# Patient Record
Sex: FEMALE | Race: BLACK OR AFRICAN AMERICAN | NOT HISPANIC OR LATINO | Employment: FULL TIME | URBAN - METROPOLITAN AREA
[De-identification: names, ages, dates, MRNs, and addresses within clinical notes are randomized per-mention and may not be internally consistent; named-entity substitution may affect disease eponyms.]

---

## 2023-03-02 PROBLEM — H52.4 BILATERAL PRESBYOPIA: Status: ACTIVE | Noted: 2023-03-02

## 2023-03-02 PROBLEM — G56.01 RIGHT CARPAL TUNNEL SYNDROME: Status: ACTIVE | Noted: 2023-03-02

## 2023-03-02 PROBLEM — M65.332 TRIGGER MIDDLE FINGER OF LEFT HAND: Status: ACTIVE | Noted: 2023-03-02

## 2023-03-02 PROBLEM — I63.50 RIGHT PONTINE STROKE (MULTI): Status: ACTIVE | Noted: 2023-03-02

## 2023-03-02 PROBLEM — I10 HTN (HYPERTENSION): Status: ACTIVE | Noted: 2023-03-02

## 2023-03-02 PROBLEM — M79.643 HAND PAIN: Status: ACTIVE | Noted: 2023-03-02

## 2023-03-02 PROBLEM — E66.01 MORBID OBESITY WITH BODY MASS INDEX (BMI) OF 40.0 OR HIGHER (MULTI): Status: ACTIVE | Noted: 2023-03-02

## 2023-03-02 PROBLEM — M65.311 TRIGGER THUMB OF RIGHT HAND: Status: ACTIVE | Noted: 2023-03-02

## 2023-03-02 PROBLEM — E11.3393 TYPE 2 DIABETES MELLITUS WITH MODERATE NONPROLIFERATIVE DIABETIC RETINOPATHY OF BOTH EYES WITHOUT MACULAR EDEMA (MULTI): Status: ACTIVE | Noted: 2023-03-02

## 2023-03-02 PROBLEM — E78.2 MIXED HYPERLIPIDEMIA: Status: ACTIVE | Noted: 2023-03-02

## 2023-03-02 RX ORDER — INSULIN LISPRO 100 [IU]/ML
INJECTION, SOLUTION INTRAVENOUS; SUBCUTANEOUS
COMMUNITY
Start: 2018-05-17 | End: 2023-03-22 | Stop reason: SDUPTHER

## 2023-03-02 RX ORDER — METOPROLOL TARTRATE 100 MG/1
TABLET ORAL
COMMUNITY
Start: 2020-11-24 | End: 2023-03-22 | Stop reason: SDUPTHER

## 2023-03-02 RX ORDER — ALBUTEROL SULFATE 90 UG/1
AEROSOL, METERED RESPIRATORY (INHALATION)
COMMUNITY
Start: 2022-02-11

## 2023-03-02 RX ORDER — INSULIN DETEMIR 100 [IU]/ML
INJECTION, SOLUTION SUBCUTANEOUS
COMMUNITY
Start: 2021-06-30 | End: 2023-03-22 | Stop reason: SDUPTHER

## 2023-03-02 RX ORDER — AMLODIPINE BESYLATE 10 MG/1
TABLET ORAL
COMMUNITY
Start: 2018-05-17 | End: 2023-03-22 | Stop reason: SDUPTHER

## 2023-03-02 RX ORDER — BLOOD SUGAR DIAGNOSTIC
STRIP MISCELLANEOUS
COMMUNITY
End: 2023-03-22 | Stop reason: SDUPTHER

## 2023-03-02 RX ORDER — ATORVASTATIN CALCIUM 40 MG/1
1 TABLET, FILM COATED ORAL DAILY
COMMUNITY
Start: 2018-05-17 | End: 2023-03-22 | Stop reason: SDUPTHER

## 2023-03-02 RX ORDER — LOSARTAN POTASSIUM 100 MG/1
1 TABLET ORAL DAILY
COMMUNITY
Start: 2018-05-17 | End: 2023-03-22 | Stop reason: SDUPTHER

## 2023-03-13 ENCOUNTER — APPOINTMENT (OUTPATIENT)
Dept: PRIMARY CARE | Facility: CLINIC | Age: 53
End: 2023-03-13
Payer: COMMERCIAL

## 2023-03-22 ENCOUNTER — OFFICE VISIT (OUTPATIENT)
Dept: PRIMARY CARE | Facility: CLINIC | Age: 53
End: 2023-03-22
Payer: COMMERCIAL

## 2023-03-22 VITALS
BODY MASS INDEX: 41.84 KG/M2 | HEIGHT: 61 IN | DIASTOLIC BLOOD PRESSURE: 75 MMHG | WEIGHT: 221.6 LBS | TEMPERATURE: 96 F | HEART RATE: 95 BPM | OXYGEN SATURATION: 98 % | SYSTOLIC BLOOD PRESSURE: 148 MMHG

## 2023-03-22 DIAGNOSIS — Z79.4 TYPE 2 DIABETES MELLITUS WITH BOTH EYES AFFECTED BY MODERATE NONPROLIFERATIVE RETINOPATHY WITHOUT MACULAR EDEMA, WITH LONG-TERM CURRENT USE OF INSULIN (MULTI): ICD-10-CM

## 2023-03-22 DIAGNOSIS — E11.3393 TYPE 2 DIABETES MELLITUS WITH BOTH EYES AFFECTED BY MODERATE NONPROLIFERATIVE RETINOPATHY WITHOUT MACULAR EDEMA, WITH LONG-TERM CURRENT USE OF INSULIN (MULTI): ICD-10-CM

## 2023-03-22 DIAGNOSIS — E66.01 MORBID OBESITY WITH BODY MASS INDEX (BMI) OF 40.0 OR HIGHER (MULTI): ICD-10-CM

## 2023-03-22 DIAGNOSIS — E78.2 MIXED HYPERLIPIDEMIA: ICD-10-CM

## 2023-03-22 DIAGNOSIS — I10 PRIMARY HYPERTENSION: Primary | ICD-10-CM

## 2023-03-22 LAB — POC HEMOGLOBIN A1C: 9.3 % (ref 4.2–6.5)

## 2023-03-22 PROCEDURE — 83036 HEMOGLOBIN GLYCOSYLATED A1C: CPT | Performed by: FAMILY MEDICINE

## 2023-03-22 PROCEDURE — 99214 OFFICE O/P EST MOD 30 MIN: CPT | Performed by: FAMILY MEDICINE

## 2023-03-22 PROCEDURE — 3077F SYST BP >= 140 MM HG: CPT | Performed by: FAMILY MEDICINE

## 2023-03-22 PROCEDURE — 4010F ACE/ARB THERAPY RXD/TAKEN: CPT | Performed by: FAMILY MEDICINE

## 2023-03-22 PROCEDURE — 3078F DIAST BP <80 MM HG: CPT | Performed by: FAMILY MEDICINE

## 2023-03-22 PROCEDURE — 1036F TOBACCO NON-USER: CPT | Performed by: FAMILY MEDICINE

## 2023-03-22 RX ORDER — AMLODIPINE BESYLATE 10 MG/1
10 TABLET ORAL DAILY
Qty: 90 TABLET | Refills: 1 | Status: SHIPPED | OUTPATIENT
Start: 2023-03-22

## 2023-03-22 RX ORDER — INSULIN LISPRO 100 [IU]/ML
INJECTION, SOLUTION INTRAVENOUS; SUBCUTANEOUS
Qty: 15 EACH | Refills: 1 | Status: SHIPPED | OUTPATIENT
Start: 2023-03-22 | End: 2023-05-15 | Stop reason: SDUPTHER

## 2023-03-22 RX ORDER — INSULIN DETEMIR 100 [IU]/ML
40 INJECTION, SOLUTION SUBCUTANEOUS DAILY
Qty: 36 ML | Refills: 1 | Status: SHIPPED | OUTPATIENT
Start: 2023-03-22 | End: 2023-03-22

## 2023-03-22 RX ORDER — INSULIN DETEMIR 100 [IU]/ML
INJECTION, SOLUTION SUBCUTANEOUS
Qty: 15 EACH | Refills: 1 | Status: SHIPPED | OUTPATIENT
Start: 2023-03-22

## 2023-03-22 RX ORDER — ATORVASTATIN CALCIUM 40 MG/1
40 TABLET, FILM COATED ORAL DAILY
Qty: 90 TABLET | Refills: 1 | Status: SHIPPED | OUTPATIENT
Start: 2023-03-22

## 2023-03-22 RX ORDER — LOSARTAN POTASSIUM 100 MG/1
100 TABLET ORAL DAILY
Qty: 90 TABLET | Refills: 1 | Status: SHIPPED | OUTPATIENT
Start: 2023-03-22

## 2023-03-22 RX ORDER — BLOOD SUGAR DIAGNOSTIC
1 STRIP MISCELLANEOUS 4 TIMES DAILY
Qty: 360 EACH | Refills: 1 | Status: SHIPPED | OUTPATIENT
Start: 2023-03-22 | End: 2023-06-20

## 2023-03-22 RX ORDER — METOPROLOL TARTRATE 100 MG/1
100 TABLET ORAL 2 TIMES DAILY
Qty: 180 TABLET | Refills: 1 | Status: SHIPPED | OUTPATIENT
Start: 2023-03-22

## 2023-03-22 ASSESSMENT — ENCOUNTER SYMPTOMS
FATIGUE: 1
BLURRED VISION: 0
VISUAL CHANGE: 0

## 2023-03-22 ASSESSMENT — PATIENT HEALTH QUESTIONNAIRE - PHQ9
2. FEELING DOWN, DEPRESSED OR HOPELESS: SEVERAL DAYS
1. LITTLE INTEREST OR PLEASURE IN DOING THINGS: SEVERAL DAYS
SUM OF ALL RESPONSES TO PHQ9 QUESTIONS 1 AND 2: 2
10. IF YOU CHECKED OFF ANY PROBLEMS, HOW DIFFICULT HAVE THESE PROBLEMS MADE IT FOR YOU TO DO YOUR WORK, TAKE CARE OF THINGS AT HOME, OR GET ALONG WITH OTHER PEOPLE: SOMEWHAT DIFFICULT

## 2023-03-22 ASSESSMENT — PAIN SCALES - GENERAL: PAINLEVEL: 0-NO PAIN

## 2023-03-22 NOTE — PATIENT INSTRUCTIONS
BP running a little high today.  Has been under stress.  No change in dose.  Monitor at home if able.    A1C 9.3, up from 8.3 last time.  Continue 40 units of levemir daily with 10 units humalog with meals.  Work on improving diet, increasing exercise.  Will refer to  pharmacist to see if we can adjust medication for better control.  Follow up 3-4 months.  Check fasting blood work some morning.                                           Treatment goals include:  HgbA1C less than 7.0  /80 on consistent basis, with no higher than 140/85  LDL cholesterol less than 100, and HDL cholesterol above 40.  Yearly retinal exam  Check feet periodically for sores or decreased sensation  Exercise at least 150 minutes weekly.  Work toward a goal BMI of less than 25.

## 2023-03-22 NOTE — ASSESSMENT & PLAN NOTE
A1C 9.3, up from 8.3 last time.  Continue 40 units of levemir daily with 10 units humalog with meals.  Work on improving diet, increasing exercise.  Will refer to  pharmacist to see if we can adjust medication for better control.  Follow up 3-4 months.

## 2023-03-22 NOTE — ASSESSMENT & PLAN NOTE
BP running a little high today.  Has been under stress.  No change in dose.  Monitor at home if able.

## 2023-03-22 NOTE — PROGRESS NOTES
"Subjective   Patient ID: Snow Lr is a 52 y.o. female who presents for Diabetes (Pt. Presents for 3 month DM follow up).  Not checking BP at home.    Not checking BS at home.  Levemir 40 units  Humalog with meals 10    Has a lot of stress right now:  Son my be legally blind, 31 years old, disabled.   heart surgery, stroke, and she is helping him.  Working full time.    Diabetes  She presents for her follow-up diabetic visit. She has type 2 diabetes mellitus. Her disease course has been worsening. There are no hypoglycemic associated symptoms. Associated symptoms include fatigue and foot paresthesias. Pertinent negatives for diabetes include no blurred vision, no chest pain, no foot ulcerations and no visual change. (Occasional tingling in feet, not often.) Diabetic complications include a CVA. Current diabetic treatment includes insulin injections. She is compliant with treatment all of the time.       Review of Systems   Constitutional:  Positive for fatigue.   Eyes:  Negative for blurred vision.   Cardiovascular:  Negative for chest pain.       Objective   /75   Pulse 95   Temp 35.6 °C (96 °F) (Temporal)   Ht 1.549 m (5' 1\")   Wt 101 kg (221 lb 9.6 oz)   SpO2 98%   BMI 41.87 kg/m²     Physical Exam  Constitutional:       Appearance: Normal appearance.   Cardiovascular:      Rate and Rhythm: Normal rate and regular rhythm.      Heart sounds: Murmur heard.   Musculoskeletal:      Right lower leg: No edema.      Left lower leg: No edema.   Neurological:      Mental Status: She is alert.           Assessment/Plan   Problem List Items Addressed This Visit          Circulatory    HTN (hypertension) - Primary     BP running a little high today.  Has been under stress.  No change in dose.  Monitor at home if able.         Relevant Medications    amLODIPine (Norvasc) 10 mg tablet    losartan (Cozaar) 100 mg tablet    metoprolol tartrate (Lopressor) 100 mg tablet       Endocrine/Metabolic    Morbid " "obesity with body mass index (BMI) of 40.0 or higher (CMS/[facility])    Type 2 diabetes mellitus with moderate nonproliferative diabetic retinopathy of both eyes without macular edema (CMS/[facility])     A1C 9.3, up from 8.3 last time.  Continue 40 units of levemir daily with 10 units humalog with meals.  Work on improving diet, increasing exercise.  Will refer to  pharmacist to see if we can adjust medication for better control.  Follow up 3-4 months.         Relevant Medications    insulin lispro (HumaLOG KwikPen Insulin) 100 unit/mL injection    pen needle, diabetic 32 gauge x 1/4\" needle    Other Relevant Orders    Follow Up In [facility] - Pharmacy    Albumin , Urine Random    Basic Metabolic Panel    Lipid Panel    POCT glycosylated hemoglobin (Hb A1C) manually resulted (Completed)       Other    Mixed hyperlipidemia     Continue atorvastatin.  Check fasting lipids.         Relevant Medications    atorvastatin (Lipitor) 40 mg tablet          "

## 2023-05-15 ENCOUNTER — TELEPHONE (OUTPATIENT)
Dept: PRIMARY CARE | Facility: CLINIC | Age: 53
End: 2023-05-15
Payer: COMMERCIAL

## 2023-05-15 DIAGNOSIS — Z79.4 TYPE 2 DIABETES MELLITUS WITH BOTH EYES AFFECTED BY MODERATE NONPROLIFERATIVE RETINOPATHY WITHOUT MACULAR EDEMA, WITH LONG-TERM CURRENT USE OF INSULIN (MULTI): ICD-10-CM

## 2023-05-15 DIAGNOSIS — E11.3393 TYPE 2 DIABETES MELLITUS WITH BOTH EYES AFFECTED BY MODERATE NONPROLIFERATIVE RETINOPATHY WITHOUT MACULAR EDEMA, WITH LONG-TERM CURRENT USE OF INSULIN (MULTI): ICD-10-CM

## 2023-05-15 RX ORDER — INSULIN LISPRO 100 [IU]/ML
INJECTION, SOLUTION INTRAVENOUS; SUBCUTANEOUS
Qty: 45 EACH | Refills: 1 | Status: SHIPPED | OUTPATIENT
Start: 2023-05-15 | End: 2023-08-17 | Stop reason: SDUPTHER

## 2023-07-24 ENCOUNTER — APPOINTMENT (OUTPATIENT)
Dept: PRIMARY CARE | Facility: CLINIC | Age: 53
End: 2023-07-24
Payer: COMMERCIAL

## 2023-08-17 DIAGNOSIS — Z79.4 TYPE 2 DIABETES MELLITUS WITH BOTH EYES AFFECTED BY MODERATE NONPROLIFERATIVE RETINOPATHY WITHOUT MACULAR EDEMA, WITH LONG-TERM CURRENT USE OF INSULIN (MULTI): ICD-10-CM

## 2023-08-17 DIAGNOSIS — E11.3393 TYPE 2 DIABETES MELLITUS WITH BOTH EYES AFFECTED BY MODERATE NONPROLIFERATIVE RETINOPATHY WITHOUT MACULAR EDEMA, WITH LONG-TERM CURRENT USE OF INSULIN (MULTI): ICD-10-CM

## 2023-08-17 RX ORDER — INSULIN LISPRO 100 [IU]/ML
INJECTION, SOLUTION INTRAVENOUS; SUBCUTANEOUS
Qty: 45 EACH | Refills: 1 | Status: SHIPPED | OUTPATIENT
Start: 2023-08-17 | End: 2023-08-17

## 2023-08-17 RX ORDER — INSULIN LISPRO 100 [IU]/ML
INJECTION, SOLUTION INTRAVENOUS; SUBCUTANEOUS
Qty: 60 ML | Refills: 1 | Status: SHIPPED | OUTPATIENT
Start: 2023-08-17

## 2023-08-18 ENCOUNTER — TELEPHONE (OUTPATIENT)
Dept: PRIMARY CARE | Facility: CLINIC | Age: 53
End: 2023-08-18
Payer: COMMERCIAL

## 2023-08-18 DIAGNOSIS — Z79.4 TYPE 2 DIABETES MELLITUS WITH BOTH EYES AFFECTED BY MODERATE NONPROLIFERATIVE RETINOPATHY WITHOUT MACULAR EDEMA, WITH LONG-TERM CURRENT USE OF INSULIN (MULTI): Primary | ICD-10-CM

## 2023-08-18 DIAGNOSIS — E11.3393 TYPE 2 DIABETES MELLITUS WITH BOTH EYES AFFECTED BY MODERATE NONPROLIFERATIVE RETINOPATHY WITHOUT MACULAR EDEMA, WITH LONG-TERM CURRENT USE OF INSULIN (MULTI): Primary | ICD-10-CM

## 2023-08-18 RX ORDER — INSULIN ASPART 100 [IU]/ML
INJECTION, SOLUTION INTRAVENOUS; SUBCUTANEOUS
Qty: 60 ML | Refills: 12 | Status: SHIPPED | OUTPATIENT
Start: 2023-08-18

## 2023-12-30 DIAGNOSIS — E78.2 MIXED HYPERLIPIDEMIA: ICD-10-CM

## 2023-12-30 DIAGNOSIS — I10 PRIMARY HYPERTENSION: ICD-10-CM

## 2023-12-30 RX ORDER — LOSARTAN POTASSIUM 100 MG/1
100 TABLET ORAL DAILY
Qty: 90 TABLET | Refills: 1 | OUTPATIENT
Start: 2023-12-30

## 2023-12-30 RX ORDER — METOPROLOL TARTRATE 100 MG/1
100 TABLET ORAL EVERY 12 HOURS
Qty: 180 TABLET | Refills: 1 | OUTPATIENT
Start: 2023-12-30

## 2023-12-30 RX ORDER — ATORVASTATIN CALCIUM 40 MG/1
40 TABLET, FILM COATED ORAL DAILY
Qty: 90 TABLET | Refills: 1 | OUTPATIENT
Start: 2023-12-30

## 2024-12-30 ENCOUNTER — APPOINTMENT (OUTPATIENT)
Dept: PRIMARY CARE | Facility: CLINIC | Age: 54
End: 2024-12-30
Payer: COMMERCIAL

## 2024-12-30 VITALS
HEART RATE: 99 BPM | SYSTOLIC BLOOD PRESSURE: 150 MMHG | TEMPERATURE: 97.1 F | WEIGHT: 228 LBS | BODY MASS INDEX: 43.08 KG/M2 | DIASTOLIC BLOOD PRESSURE: 100 MMHG | OXYGEN SATURATION: 97 %

## 2024-12-30 DIAGNOSIS — E11.3393 TYPE 2 DIABETES MELLITUS WITH BOTH EYES AFFECTED BY MODERATE NONPROLIFERATIVE RETINOPATHY WITHOUT MACULAR EDEMA, WITH LONG-TERM CURRENT USE OF INSULIN: Primary | ICD-10-CM

## 2024-12-30 DIAGNOSIS — I10 PRIMARY HYPERTENSION: ICD-10-CM

## 2024-12-30 DIAGNOSIS — Z79.4 TYPE 2 DIABETES MELLITUS WITH BOTH EYES AFFECTED BY MODERATE NONPROLIFERATIVE RETINOPATHY WITHOUT MACULAR EDEMA, WITH LONG-TERM CURRENT USE OF INSULIN: Primary | ICD-10-CM

## 2024-12-30 DIAGNOSIS — I63.9 CEREBROVASCULAR ACCIDENT (CVA), UNSPECIFIED MECHANISM (MULTI): ICD-10-CM

## 2024-12-30 DIAGNOSIS — I63.50 RIGHT PONTINE STROKE (MULTI): ICD-10-CM

## 2024-12-30 DIAGNOSIS — E78.2 MIXED HYPERLIPIDEMIA: ICD-10-CM

## 2024-12-30 LAB — POC HEMOGLOBIN A1C: 13.1 % (ref 4.2–6.5)

## 2024-12-30 PROCEDURE — 3077F SYST BP >= 140 MM HG: CPT | Performed by: FAMILY MEDICINE

## 2024-12-30 PROCEDURE — 4010F ACE/ARB THERAPY RXD/TAKEN: CPT | Performed by: FAMILY MEDICINE

## 2024-12-30 PROCEDURE — 83036 HEMOGLOBIN GLYCOSYLATED A1C: CPT | Performed by: FAMILY MEDICINE

## 2024-12-30 PROCEDURE — 99214 OFFICE O/P EST MOD 30 MIN: CPT | Performed by: FAMILY MEDICINE

## 2024-12-30 PROCEDURE — 3080F DIAST BP >= 90 MM HG: CPT | Performed by: FAMILY MEDICINE

## 2024-12-30 ASSESSMENT — PAIN SCALES - GENERAL: PAINLEVEL_OUTOF10: 0-NO PAIN

## 2024-12-30 NOTE — PROGRESS NOTES
Subjective   Patient ID: Snow Lr is a 54 y.o. female who presents for Hypertension.  Patient presents with her  after absence of nearly 2 years, due to loss of insurance.  Had seen a provider at [facility] in Feb 2024, but no one in between.  Has been out of medication for several months.  History includes HTN, CVA, Typ 2 diabetes insulin dependent.  Not checking glucose at home.  Needs disability placard, due to difficulty with ambulation after stroke.  Medications prior to running out included losartan and metoprolol for BP and levemir with mealtime insulin for diabetes, atorvastatin for cholesterol.    She is back to work, has insurance again.  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        Hypertension  This is a chronic problem. The problem is uncontrolled. Pertinent negatives include no anxiety, blurred vision, chest pain, headaches, palpitations, peripheral edema or shortness of breath. Risk factors for coronary artery disease include diabetes mellitus, dyslipidemia, obesity and post-menopausal state. Hypertensive end-organ damage includes CVA.   Diabetes  She presents for her follow-up diabetic visit. She has type 2 diabetes mellitus. Her disease course has been worsening. There are no hypoglycemic associated symptoms. Pertinent negatives for hypoglycemia include no headaches. Associated symptoms include foot paresthesias. Pertinent negatives for diabetes include no blurred vision, no chest pain and no foot ulcerations. Diabetic complications include a CVA. She is compliant with treatment none of the time.       Review of Systems   Eyes:  Negative for blurred vision.   Respiratory:  Negative for shortness of breath.    Cardiovascular:  Negative for chest pain and palpitations.   Neurological:  Negative for headaches.       Objective   BP (!) 150/100 (BP Location: Right arm, Patient Position: Sitting, BP Cuff Size: Large adult)   Pulse 99   Temp 36.2  °C (97.1 °F) (Temporal)   Wt 103 kg (228 lb)   SpO2 97%   BMI 43.08 kg/m²     Physical Exam  Vitals reviewed.   Constitutional:       Appearance: Normal appearance.   Cardiovascular:      Rate and Rhythm: Normal rate and regular rhythm.      Pulses:           Dorsalis pedis pulses are 2+ on the right side and 2+ on the left side.      Heart sounds: No murmur heard.  Pulmonary:      Effort: Pulmonary effort is normal.      Breath sounds: Normal breath sounds.   Musculoskeletal:      Right lower leg: No edema.      Left lower leg: No edema.   Feet:      Right foot:      Protective Sensation: 5 sites tested.  5 sites sensed.      Skin integrity: Skin integrity normal.      Left foot:      Protective Sensation: 5 sites tested.  5 sites sensed.      Skin integrity: Skin integrity normal.   Skin:     Capillary Refill: Capillary refill takes less than 2 seconds.   Neurological:      Mental Status: She is alert.   Psychiatric:         Mood and Affect: Mood normal.         Behavior: Behavior normal.           Assessment/Plan   Problem List Items Addressed This Visit       HTN (hypertension)    Relevant Medications    losartan (Cozaar) 100 mg tablet    Mixed hyperlipidemia    Relevant Medications    atorvastatin (Lipitor) 40 mg tablet    Right pontine stroke (Multi)    Type 2 diabetes mellitus with moderate nonproliferative diabetic retinopathy of both eyes without macular edema - Primary    Relevant Medications    insulin detemir (Levemir FlexPen) 100 unit/mL (3 mL) pen    blood-glucose sensor (FreeStyle Mildred 3 Sensor) device    Other Relevant Orders    Referral to Clinical Pharmacy    POCT glycosylated hemoglobin (Hb A1C) manually resulted (Completed)    Comprehensive Metabolic Panel    Lipid Panel    Albumin-Creatinine Ratio, Urine Random     Other Visit Diagnoses       Cerebrovascular accident (CVA), unspecified mechanism (Multi)        Relevant Orders    Disability Placard

## 2024-12-31 RX ORDER — INSULIN DETEMIR 100 [IU]/ML
20 INJECTION, SOLUTION SUBCUTANEOUS NIGHTLY
Qty: 18 ML | Refills: 1 | Status: SHIPPED | OUTPATIENT
Start: 2024-12-31 | End: 2024-12-31 | Stop reason: SDUPTHER

## 2024-12-31 RX ORDER — ATORVASTATIN CALCIUM 40 MG/1
40 TABLET, FILM COATED ORAL DAILY
Qty: 90 TABLET | Refills: 1 | Status: SHIPPED | OUTPATIENT
Start: 2024-12-31

## 2024-12-31 RX ORDER — BLOOD-GLUCOSE SENSOR
EACH MISCELLANEOUS
Qty: 5 EACH | Refills: 1 | Status: SHIPPED | OUTPATIENT
Start: 2024-12-31 | End: 2024-12-31 | Stop reason: SDUPTHER

## 2024-12-31 RX ORDER — LOSARTAN POTASSIUM 100 MG/1
100 TABLET ORAL DAILY
Qty: 90 TABLET | Refills: 1 | Status: SHIPPED | OUTPATIENT
Start: 2024-12-31

## 2024-12-31 RX ORDER — ATORVASTATIN CALCIUM 40 MG/1
40 TABLET, FILM COATED ORAL DAILY
Qty: 90 TABLET | Refills: 1 | Status: SHIPPED | OUTPATIENT
Start: 2024-12-31 | End: 2024-12-31 | Stop reason: SDUPTHER

## 2024-12-31 RX ORDER — BLOOD-GLUCOSE SENSOR
EACH MISCELLANEOUS
Qty: 5 EACH | Refills: 1 | Status: SHIPPED | OUTPATIENT
Start: 2024-12-31

## 2024-12-31 RX ORDER — LOSARTAN POTASSIUM 100 MG/1
100 TABLET ORAL DAILY
Qty: 90 TABLET | Refills: 1 | Status: SHIPPED | OUTPATIENT
Start: 2024-12-31 | End: 2024-12-31 | Stop reason: SDUPTHER

## 2024-12-31 RX ORDER — INSULIN DETEMIR 100 [IU]/ML
20 INJECTION, SOLUTION SUBCUTANEOUS NIGHTLY
Qty: 18 ML | Refills: 1 | Status: SHIPPED | OUTPATIENT
Start: 2024-12-31 | End: 2025-06-29

## 2024-12-31 ASSESSMENT — ENCOUNTER SYMPTOMS
SHORTNESS OF BREATH: 0
HEADACHES: 0
PALPITATIONS: 0
BLURRED VISION: 0
HYPERTENSION: 1

## 2024-12-31 NOTE — PATIENT INSTRUCTIONS
BP high, but off of all mediation.  Restart losartan 100 mg daily.   Monitor BP at home.  Will add metoprolol back on if needed for better control.    A1C 13.1, indicates poor control of diabetes.  Since not checking glucose, and off medication for months, will restart with 20 units of Levemir nightly.  Continuous glucose monitor prescribed for better monitoring of glucose, and adjustment of insulin.  Will add Humalog back on for mealtime, once able to verify not going to go too low.  Work on improving diet, increasing exercise.                                      Treatment goals include:  HgbA1C less than 7.0  /80 on consistent basis, with no higher than 140/85  LDL cholesterol less than 100, and HDL cholesterol above 40.  Yearly retinal exam  Check feet periodically for sores or decreased sensation  Exercise at least 150 minutes weekly.  Work toward a goal BMI of less than 25.    Follow up in 1 month, and bring readings of glucose and BP.  Check fasting blood work some morning.     Fast for 12 hours prior to having blood drawn.  You should drink plenty of water, and can have black tea or black coffee, if you'd like.

## 2025-01-02 ENCOUNTER — TELEPHONE (OUTPATIENT)
Dept: PRIMARY CARE | Facility: CLINIC | Age: 55
End: 2025-01-02
Payer: COMMERCIAL

## 2025-01-02 NOTE — TELEPHONE ENCOUNTER
Pt LVM stating the pharmacy told her she needs a PA for her FreeStyle Mildred 3 sensor because insurance does not cover it. PA has already been started in CoverMyMeds. Waiting on a response.

## 2025-01-03 NOTE — TELEPHONE ENCOUNTER
Received fax for PA requesting more information on patients diabetes. Faxed office note to [facility] at [phone].

## 2025-01-07 NOTE — PROGRESS NOTES
Clinical Pharmacy Appointment    Patient ID: Snow Lr is a 54 y.o. female who presents for Diabetes.    Pt is here for First appointment.     Referring Provider: Kaur Orellana MD  PCP: Kaur Orellana MD   Last visit with PCP: 12/30/2024   Next visit with PCP: 1/27/2025      Subjective   HPI  DIABETES MELLITUS TYPE 2:    Known diabetic complications: CVA.  Does patient follow with Endocrinology: No  Last optometry exam: patient   Most recent visit in Podiatry: patient checks her feet at home, checks at PCP office-- patient denies sores or cuts on feet today      Current diabetic medications include:  Levemir 100 units/mL; inject 20 units nightly     Clarifications to above regimen: none  Adverse Effects: none    Past diabetic medications include:  Metformin    Glucose Readings:  Glucometer/CGM Type: FreeStyle Mildred 3 - patient has not gotten yet, PA required  Patient tests BG 0 times per day    Any episodes of hypoglycemia? No, patient has not been checking her blood sugar, but did not report any symptoms of hypoglycemia .  Did patient treat episode of hypoglycemia appropriately? N/A  Does the patient have a prescription for ready-to-use Glucagon? No once patient starts checking blood sugar regularly, if needed can give prescription for glucagon  Does pt have proteinuria? N/A    Lifestyle:  Diet: Patient usually doesn't eat all day until dinner time  DN: Spaghetti, pork chop, chicken  Snacks: chips, apple, oranges, cereal  Drinks: Water  Physical Activity: Walks a lot with work    Secondary Prevention:  Statin? Yes  ACE-I/ARB? Yes  Aspirin? No    Pertinent PMH Review:  PMH of Pancreatitis: Yes  PMH of Retinopathy: Yes  PMH of Urinary Tract Infections: No  PMH of MTC: No    Immunizations:  Influenza? No - counseled patient to receive  COVID? No - counseled patient to receive updated booster  Pneumonia? No - counseled patient to receive  Shingles? No    Drug Interactions  No relevant drug interactions were  "noted.    Medication System Management  Patient's preferred pharmacy: [id number] in [place]    Objective   Allergies   Allergen Reactions    Lisinopril Unknown     Social History     Social History Narrative    Not on file      Medication Review  Current Outpatient Medications   Medication Instructions    albuterol 90 mcg/actuation inhaler INHALE 1 TO 2 PUFFS BY MOUTH EVERY 4-6 HOURS AS NEEDED    atorvastatin (LIPITOR) 40 mg, oral, Daily    blood-glucose sensor (FreeStyle Mildred 3 Sensor) device Use to check glucose TID or as needed.    Levemir FlexPen 20 Units, subcutaneous, Nightly, Take as directed per insulin instructions.    losartan (COZAAR) 100 mg, oral, Daily      Vitals  BP Readings from Last 2 Encounters:   12/30/24 (!) 150/100   03/22/23 148/75     BMI Readings from Last 1 Encounters:   12/30/24 43.08 kg/m²      Labs  A1C  Lab Results   Component Value Date    HGBA1C 13.1 (A) 12/30/2024    HGBA1C 9.3 (A) 03/22/2023    HGBA1C 10.5 04/23/2018     BMP  Lab Results   Component Value Date    CALCIUM 8.8 04/24/2018     04/24/2018    K 3.7 04/24/2018    CO2 27 04/24/2018     04/24/2018    BUN 11 04/24/2018    CREATININE 0.91 04/24/2018     LFTs  Lab Results   Component Value Date    ALT 12 04/24/2018    AST 9 04/24/2018    ALKPHOS 100 04/24/2018    BILITOT 0.4 04/24/2018     FLP  Lab Results   Component Value Date    TRIG 106 04/23/2018    CHOL 139 04/23/2018    LDLF 83 04/23/2018    HDL 34.4 (A) 04/23/2018     Urine Microalbumin  No results found for: \"MICROALBCREA\"  Weight Management  Wt Readings from Last 3 Encounters:   12/30/24 103 kg (228 lb)   03/22/23 101 kg (221 lb 9.6 oz)   12/01/22 98.2 kg (216 lb 8 oz)      There is no height or weight on file to calculate BMI.     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  Is pt at " goal? No, patient's most recent A1c from 12/30/2024 was 13.1%  Patient has not been checking blood sugars regularly at home, given prescription for Mildred 3 sensors. PA required so patient has not filled yet.     Rationale for plan: Once PA approved, patient will start checking sugar levels regularly.    Medication Changes:  CONTINUE  Levemir 100 units/mL; inject 20 units nightly    Future Considerations:  Once patient is able to start monitoring, can adjust medications based on home blood sugar readings and A1c changes    Monitoring and Education:  Counseled patient on the importance of checking regularly once she gets the PA approved for the freestyle sensors.  Answered all patient questions and concerns            Clinical Pharmacist follow-up: 1/22/2025, Telehealth visit    Continue all meds under the continuation of care with the referring provider and clinical pharmacy team.    Thank you,  Marie Gutierrez, PharmD  Clinical Pharmacist - Primary Care  [phone]  1/8/2025    Verbal consent to manage patient's drug therapy was obtained from the patient. They were informed they may decline to participate or withdraw from participation in pharmacy services at any time.

## 2025-01-08 ENCOUNTER — APPOINTMENT (OUTPATIENT)
Dept: PHARMACY | Facility: HOSPITAL | Age: 55
End: 2025-01-08
Payer: COMMERCIAL

## 2025-01-08 DIAGNOSIS — E11.3393 TYPE 2 DIABETES MELLITUS WITH BOTH EYES AFFECTED BY MODERATE NONPROLIFERATIVE RETINOPATHY WITHOUT MACULAR EDEMA, WITH LONG-TERM CURRENT USE OF INSULIN: ICD-10-CM

## 2025-01-08 DIAGNOSIS — Z79.4 TYPE 2 DIABETES MELLITUS WITH BOTH EYES AFFECTED BY MODERATE NONPROLIFERATIVE RETINOPATHY WITHOUT MACULAR EDEMA, WITH LONG-TERM CURRENT USE OF INSULIN: ICD-10-CM

## 2025-01-08 NOTE — ASSESSMENT & PLAN NOTE
Patient's goal A1c is < 7%.  Is pt at goal? No, patient's most recent A1c from 12/30/2024 was 13.1%  Patient has not been checking blood sugars regularly at home, given prescription for Mildred 3 sensors. PA required so patient has not filled yet.     Rationale for plan: Once PA approved, patient will start checking sugar levels regularly.    Medication Changes:  CONTINUE  Levemir 100 units/mL; inject 20 units nightly    Future Considerations:  Once patient is able to start monitoring, can adjust medications based on home blood sugar readings and A1c changes    Monitoring and Education:  Counseled patient on the importance of checking regularly once she gets the PA approved for the freestyle sensors.  Answered all patient questions and concerns

## 2025-01-10 RX ORDER — BLOOD-GLUCOSE,RECEIVER,CONT
EACH MISCELLANEOUS
Qty: 1 EACH | Refills: 0 | Status: SHIPPED | OUTPATIENT
Start: 2025-01-10

## 2025-01-22 ENCOUNTER — APPOINTMENT (OUTPATIENT)
Dept: PHARMACY | Facility: HOSPITAL | Age: 55
End: 2025-01-22
Payer: COMMERCIAL

## 2025-01-27 ENCOUNTER — APPOINTMENT (OUTPATIENT)
Dept: PRIMARY CARE | Facility: CLINIC | Age: 55
End: 2025-01-27
Payer: COMMERCIAL

## 2025-03-25 ENCOUNTER — APPOINTMENT (OUTPATIENT)
Dept: PRIMARY CARE | Facility: CLINIC | Age: 55
End: 2025-03-25
Payer: COMMERCIAL

## 2025-11-17 ENCOUNTER — APPOINTMENT (OUTPATIENT)
Dept: PRIMARY CARE | Facility: CLINIC | Age: 55
End: 2025-11-17
Payer: COMMERCIAL